# Patient Record
(demographics unavailable — no encounter records)

---

## 2024-10-24 NOTE — ASSESSMENT
[FreeTextEntry1] : 24 y/o female followup with upper back pain that radiates across her left scapula. Her pain began after she was doing chest flies at the gym. She saw an orthopedist after the injury and was given cyclobenzaprine. She has also received multiple toradol injections without relief. She denies radicular pain, recent illness, fevers, numbness, weakness, balance problems, saddle anesthesia, urinary retention or fecal incontinence. I independently reviewed her thoracic MRI which shows degenerative changes without compression of the neural elements. The patient was given a referral for physical therapy.  F/U in 6 weeks. We discussed red flag symptoms that would require emergent evaluation. She knows to call with any questions or concerns or if her symptoms acutely worsen.

## 2024-10-24 NOTE — PHYSICAL EXAM
[de-identified] : General: NAD AAOx4, well-appearing Respiratory: No visible respiratory distress Psych: Good mood and appropriate affect Skin: WWP, no rashes Gait: Normal gait, can do tandem gait MSK: Spine: no visible deformity, describes back pain Neuro: denies numbness, grossly moving all extremities [de-identified] : I independently reviewed her thoracic MRI which shows degenerative changes without compression of the neural elements.    MRI of the thoracic spine 10/15/2024  T10-11: Facet arthrosis contributes to moderate left foraminal stenosis.  T11-12: Minimal annular bulging contributing to mild to moderate right foraminal stenosis.

## 2024-10-24 NOTE — HISTORY OF PRESENT ILLNESS
[de-identified] : This visit was provided by the [facility] telemedicine service.  This telehealth appointment was conducted using real-time 2-way audiovisual technology.  The patient Pearl Napier was at her home during the time of the visit. The provider, Giuseppe Harris was located at his office at [address] [place] [address] at the time of the visit.  The patient and Giuseppe Harris participated in the telehealth encounter.  Verbal consent was given on October 24, 2024 by the patient.  HPI: Telehealth via [employer] service: 30 minutes  26 y/o female followup with upper back pain that radiates across her left scapula. Her pain began after she was doing chest flies at the gym. She saw an orthopedist after the injury and was given cyclobenzaprine. She has also received multiple toradol injections without relief. She denies radicular pain, recent illness, fevers, numbness, weakness, balance problems, saddle anesthesia, urinary retention or fecal incontinence. She is here to review her MRI.

## 2024-12-15 NOTE — ASSESSMENT
[FreeTextEntry1] : KENNY LONDON is a 25 year old F who presents for initial consultation for reconstructive options after planned excision of pilonidal cyst.  At this time, recommended reconstruction of the pilonidal area following excision as there will be a full thickness defect of the involved area.   It was discussed with the patient that the reconstructive options will be based on the defect size and surrounding tissue laxity of the involved area. Patient was counseled that the area involved is a high tension area that may affect wound healing. We discussed the surgical reconstruction which involves a local tissue rearrangement, such as the Limberg or the Dufourmentel flap. We also discussed the necessity of placement of a drain. The patient was explained the potential complications and risks of the surgery in full detail. Risks following layered primary closure or local tissue rearrangement includes but are not limited to wound dehiscence, delayed wound healing, contour irregularities, bleeding, bruising, infection, fat necrosis, recurrence, numbness or paraesthesias. All questions were answered.   - Plan for reconstruction of pilkonidal wound, possible local flap - Our office will coordinate with Dr. Zamora - Surgical paperwork submitted

## 2024-12-15 NOTE — PHYSICAL EXAM
[TextEntry] : Sacrococcygeal: hirsute, no erythema,  palpable mass with palpable sinus tract, - fluctuance, no abscess, + pits along the midline, non-tender, no drainage noted.  Constitutional: NAD, well-nourished HENT: Normocephalic, atraumatic, PERRL, non-icteric sclerae, neck supple, trachea midline PULM: LSCTAB, no wheezing or rhonchi CV: RRR, no murmur, rubs, or gallops Abd: Soft, NT, ND, +BS, no palpable masses or bulge MSK: SLOAN, 5/5 strength to all extremities Skin: No rash noted Neuro: A&O x 3, no FND Psych: Mood is appropriate

## 2024-12-15 NOTE — CONSULT LETTER
[Dear  ___] : Dear  [unfilled], [Consult Letter:] : I had the pleasure of evaluating your patient, [unfilled]. [Please see my note below.] : Please see my note below. [Consult Closing:] : Thank you very much for allowing me to participate in the care of this patient.  If you have any questions, please do not hesitate to contact me. [Sincerely,] : Sincerely, [FreeTextEntry3] : Lavelle Lozoya MD

## 2024-12-15 NOTE — HISTORY OF PRESENT ILLNESS
[FreeTextEntry1] : KENNY LONDON is a 25 year old F who presents for initial consultation for reconstructive options after planned excision of pilonidal cyst.   PMHx: PSHx: Family hx: Allergies: Current meds: Social hx:

## 2024-12-30 NOTE — PHYSICAL EXAM
[TextEntry] : Sacrococcygeal: +multiple pits along midline, nontender, no drainage noted two distinct palpable mass from midline slightly to the right as well as one superior to the pits -hirsute, no erythema  Constitutional: NAD, well-nourished HENT: Normocephalic, atraumatic, PERRL, non-icteric sclerae, neck supple, trachea midline PULM: LSCTAB, no wheezing or rhonchi CV: RRR, no murmur, rubs, or gallops Abd: Soft, NT, ND, +BS, no palpable masses or bulge MSK: SLOAN, 5/5 strength to all extremities Skin: No rash noted Neuro: A&O x 3, no FND Psych: Mood is appropriate

## 2024-12-30 NOTE — CONSULT LETTER
[Dear  ___] : Dear  [unfilled], [Consult Letter:] : I had the pleasure of evaluating your patient, [unfilled]. [Please see my note below.] : Please see my note below. [Consult Closing:] : Thank you very much for allowing me to participate in the care of this patient.  If you have any questions, please do not hesitate to contact me. [Sincerely,] : Sincerely, [( Thank you for referring [unfilled] for consultation for _____ )] : Thank you for referring [unfilled] for consultation for [unfilled] [FreeTextEntry3] : Lavelle Lozoya MD

## 2024-12-30 NOTE — HISTORY OF PRESENT ILLNESS
[FreeTextEntry1] : KENNY LONDON is a 25 year old F who presents for initial consultation for reconstructive options after planned excision of pilonidal cyst.  Patient reports having a palpable mass since last year with intermittent drainage and flare up every 2 months, last drainage noted from the area was last week. She has tried to squeeze the palpable mass last year to express drainage.  At this time, denies cp, sob, subjective fever, chills, headache, cough, myalgia, malaise, abd pain, n/v/d, decreased appetite, and generalized weakness.  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

## 2024-12-30 NOTE — ASSESSMENT
[FreeTextEntry1] : KENNY LONDON is a 25 year old F who presents for initial consultation for reconstructive options after planned excision of pilonidal cyst.  At this time, recommended reconstruction of the pilonidal area following excision as there will be a full thickness defect of the involved area.   It was discussed with the patient that the reconstructive options will be based on the defect size and surrounding tissue laxity of the involved area. Patient was counseled that the area involved is a high tension area that may affect wound healing. We discussed the surgical reconstruction which involves a local tissue rearrangement, such as the Limberg or the Dufourmentel flap. We also discussed the necessity of placement of a drain. The patient was explained the potential complications and risks of the surgery in full detail. Risks following layered primary closure or local tissue rearrangement includes but are not limited to wound dehiscence, delayed wound healing, contour irregularities, bleeding, bruising, infection, fat necrosis, recurrence, numbness or paraesthesias. All questions were answered; the patient fully understands the risks and plan and would like to proceed as discussed today.   - Plan for reconstruction of pilonidal wound, possible local flap - Our office will coordinate with Dr. Zamora - Surgical paperwork submitted; patient req sgy early February

## 2024-12-30 NOTE — ADDENDUM
[FreeTextEntry1] : I, Rodri Balderas, documented this note as a scribe on behalf of Dr. Lavelle Lozoya MD on 12/28/2024. I, Gustavo Lozoya NP, am scribing for and in the presence of Dr. Lavelle Lozoya MD, the following sections: HISTORY OF PRESENT ILLNESS, PAST MEDICAL/FAMILY/SOCIAL HISTORY, REVIEW OF SYSTEMS, VITAL SIGNS, PHYSICAL EXAM, & DISPOSITION.   I personally performed the services described in the documentation, reviewed the documentation recorded by the NP/scribe in my presence and it accurately and completely records my words and actions.